# Patient Record
Sex: MALE | Race: WHITE
[De-identification: names, ages, dates, MRNs, and addresses within clinical notes are randomized per-mention and may not be internally consistent; named-entity substitution may affect disease eponyms.]

---

## 2019-03-25 ENCOUNTER — HOSPITAL ENCOUNTER (OUTPATIENT)
Dept: HOSPITAL 25 - AA | Age: 79
Setting detail: OBSERVATION
LOS: 1 days | Discharge: HOME | End: 2019-03-26
Attending: ORTHOPAEDIC SURGERY | Admitting: ORTHOPAEDIC SURGERY
Payer: MEDICARE

## 2019-03-25 DIAGNOSIS — M12.511: Primary | ICD-10-CM
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
## 2019-03-25 DIAGNOSIS — Z87.891: ICD-10-CM

## 2019-03-25 DIAGNOSIS — T14.90XS: ICD-10-CM

## 2019-03-25 DIAGNOSIS — Z79.82: ICD-10-CM

## 2019-03-25 DIAGNOSIS — Z88.0: ICD-10-CM

## 2019-03-25 PROCEDURE — C1713 ANCHOR/SCREW BN/BN,TIS/BN: HCPCS

## 2019-03-25 PROCEDURE — G0378 HOSPITAL OBSERVATION PER HR: HCPCS

## 2019-03-25 PROCEDURE — 85018 HEMOGLOBIN: CPT

## 2019-03-25 PROCEDURE — 80048 BASIC METABOLIC PNL TOTAL CA: CPT

## 2019-03-25 PROCEDURE — 85014 HEMATOCRIT: CPT

## 2019-03-25 PROCEDURE — 85048 AUTOMATED LEUKOCYTE COUNT: CPT

## 2019-03-25 PROCEDURE — 36415 COLL VENOUS BLD VENIPUNCTURE: CPT

## 2019-03-25 PROCEDURE — 85049 AUTOMATED PLATELET COUNT: CPT

## 2019-03-25 PROCEDURE — C1776 JOINT DEVICE (IMPLANTABLE): HCPCS

## 2019-03-25 PROCEDURE — 87641 MR-STAPH DNA AMP PROBE: CPT

## 2019-03-25 RX ADMIN — TRAMADOL HYDROCHLORIDE SCH MG: 50 TABLET, FILM COATED ORAL at 17:39

## 2019-03-25 RX ADMIN — MAGNESIUM HYDROXIDE SCH ML: 400 SUSPENSION ORAL at 20:26

## 2019-03-25 RX ADMIN — DOCUSATE SODIUM SCH MG: 100 CAPSULE, LIQUID FILLED ORAL at 20:26

## 2019-03-25 RX ADMIN — CLINDAMYCIN IN 5 PERCENT DEXTROSE SCH MLS/HR: 12 INJECTION, SOLUTION INTRAVENOUS at 20:15

## 2019-03-25 RX ADMIN — ACETAMINOPHEN SCH MG: 325 TABLET ORAL at 22:33

## 2019-03-25 RX ADMIN — DEXTROSE MONOHYDRATE AND SODIUM CHLORIDE SCH MLS/HR: 5; .45 INJECTION, SOLUTION INTRAVENOUS at 16:19

## 2019-03-25 RX ADMIN — Medication SCH: at 20:26

## 2019-03-25 NOTE — XMS REPORT
Continuity of Care Document (CCD)

 Created on:2019



Patient:Savage Garvin

Sex:Male

:1940

External Reference #:[id number]





Demographics







 Address  [address] [facility] [address]



   Serjio [place]



   [place] [address]

 

 Mobile Phone  [phone]

 

 Email Address  [email or web address]

 

 Preferred Language  en

 

 Marital Status  Not  or 

 

 Mosque Affiliation  Unknown

 

 Race  White

 

 Ethnic Group  Not  or 









Author







 Name  Aditi Jean









Support







 Name  Relationship  Address  Phone

 

 Christoph Garvin  Unavailable  [email or web address]  [phone]



     [place] [address]  









Care Team Providers







 Name  Role  Phone

 

 Deny Manzano DO  Primary Care Physician  Unavailable









Payers







 Date  Identification Numbers  Payment Provider  Subscriber

 

 Effective: 2005  Policy Number: [id number]  Medicare  Savage DOSHI Bharathi









 PayID: [id number]  [address]









 [place] [address]









 Effective: 2016  Policy Number: [id number]  [employer]  Savage Garvin









 PayID: [id number]  [address] [address]









 [place] [address]









   Policy Number: [id number]  Medicaid  Savage Garvin









 PayID: [id number]  [address]









 [place] [address]









 Effective: 2017  Policy Number: [id number]  [employer]  Savage Garvin









 Expires: 10/17/2018  Group Number: 90%  [address] [facility]









 PayID: [id number]  [facility] [address]









 [place] [address]









 Onset: 2014  Policy Number: [id number]   Controverted  Savage GLENDA Bharathi









 Group Number: [id number]

 

 PayID: [id number]









 Expires: 2016  Policy Number: [id number]  [facility]  
Savage GLENDA Bharathi









 PayID: [id number]  [address]









 [place] [address]









 Onset: 2014  Policy Number: [id number]  [facility]  Savage DOSHI 
Bahrathi









 Group Number: [id number]  [address] [address]

 

 [place]: [id number] [address]







Advance Directives







 Description

 

 No Information Available







Problems







 Date  Description  Provider  Status

 

 Onset: 2015  Full thickness rotator cuff tear  Guerrero Nunez M.D.  
Active

 

 Onset: 2016  Rotator cuff tear arthropathy  Guerrero Nunez M.D.  Active

 

 Onset: 06/15/2017  Traumatic arthropathy of the shoulder  Guerrero Nunez M.D.
  Active



   region    







Family History







 Date  Family Member(s)  Observation  Comments

 

   General  No Current Problems  

 

   General  Breast Cancer  

 

   General  Heart Disease  







Social History







 Type  Date  Description  Comments

 

 Birth Sex    Unknown  

 

 Marital Status      

 

 Lives With    Son  

 

 Occupation    Retired  

 

 Cigarette Use    Negative For Quit 11 Years  



     Ago  

 

 Cigarette Use    Quit 25 Years Ago  

 

 ETOH Use    Denies alcohol use  

 

 Tobacco Use  Start: Unknown End:  Patient is a former smoker  



   Unknown    

 

 Recreational Drug Use    Denies Drug Use  

 

 Smoking Status  Reviewed: 19  Patient is a former smoker  

 

 Exercise Type/Frequency    Exercises regularly  







Allergies, Adverse Reactions, Alerts







 Date  Description  Reaction  Status  Severity  Comments

 

 2013  Penicillin  Contact dermatitis  Active    







Medications







 Medication  Date  Status  Form  Strength  Qnty  SIG  Indications  Ordering



                 Provider

 

 Ultram  /  Active  Tablets  50mg  40tab  1-2 by    Guerrero



   2015  mouthat    Enrique,



             night as    M.DAshley



             needed, can    



             take with 2    



             es tylenol    

 

 Aspirin Low  00/00/  Active  Tablets  81mg  30tab  1 po qd    Unknown



 Dose  0000        s      

 

 Paroxetine HCL  0000/  Active  Tablets  10mg  30tab  1 by mouth    Unknown



   0000        s  every day    

 

 Omeprazole  00/  Active  Capsules DR  20mg  90cap  1 by mouth    Unknown



   0000        s  every day    

 

 Megace Oral  00/  Active  Suspension  40mg/ml  1Mont  10    Unknown



   0000        h  milliliters    



             by mouth    



             every day    

 

 Tylenol  00/00/  Active  Tablets  325mg    as needed    Unknown



   0000              

 

                 

 

 Methotrexate  /  Hx  Tablets  2.5mg        Kimberlyn Del Castillo



   /              MIRACLE Andrew              

 

 Vit D  /  Hx    400Iu    1 daily    Unknown



    -              



   2019              

 

 Fish Oil  /00/  Hx            Unknown



    -              



   2018              

 

 Levitra  00/  Hx  Tablets  10mg  9tabs  qd prn    Unknown



    -              



   2018              

 

 Calcium + D3  00/  Hx  Tablets  600-200mg    1 pobid    Unknown



   0000 -      -Unit        



   2014              

 

 Vitamin B-12  /00/  Hx  Tablets  100mcg    1 by mouth    Unknown



   0000 -          every day    



   10/01/              



   2018              

 

 Aspir-Low  /00/  Hx  Tablets DR  81mg  30tab  1 by mouth    Unknown



   0000 -        s  every day    



   2014              

 

 Caltrate 600+D  00/  Hx  Chewtabs  600-400mg    1 by mouth    Unknown



   0000 -      -Unit    twice a day    



   2019              







Immunizations







 Description

 

 No Information Available







Vital Signs







 Date  Vital  Result  Comment

 

 2019  8:41am  Height  67 inches  5'7"









 Weight  159.00 lb  

 

 Heart Rate  68 /min  

 

 BP Systolic Recheck  140 mmHg  

 

 BP Diastolic Recheck  86 mmHg  

 

 Respiratory Rate  16 /min  

 

 Body Temperature  97.6 F  

 

 BMI (Body Mass Index)  24.9 kg/m2  









 2019  9:36am  Height  67 inches  5'7"









 Weight  159.00 lb  

 

 Heart Rate  76 /min  

 

 BP Systolic Recheck  134 mmHg  

 

 BP Diastolic Recheck  80 mmHg  

 

 Respiratory Rate  16 /min  

 

 Body Temperature  98.6 F  

 

 BMI (Body Mass Index)  24.9 kg/m2  









 2019  9:06am  Height  67 inches  5'7"









 Weight  165.00 lb  

 

 Heart Rate  72 /min  

 

 BP Systolic Recheck  144 mmHg  

 

 BP Diastolic Recheck  88 mmHg  

 

 Respiratory Rate  16 /min  

 

 Body Temperature  97.7 F  

 

 BMI (Body Mass Index)  25.8 kg/m2  









 10/23/2018  9:17am  Height  67 inches  5'7"









 Weight  159.00 lb  

 

 Heart Rate  76 /min  

 

 BP Systolic Recheck  132 mmHg  

 

 BP Diastolic Recheck  84 mmHg  

 

 Respiratory Rate  16 /min  

 

 Body Temperature  975.0 F  

 

 BMI (Body Mass Index)  24.9 kg/m2  









 2018  9:13am  Height  67 inches  5'7"









 Weight  159.00 lb  

 

 Heart Rate  76 /min  

 

 BP Systolic Recheck  130 mmHg  

 

 BP Diastolic Recheck  84 mmHg  

 

 Respiratory Rate  16 /min  

 

 Body Temperature  97.4 F  

 

 BMI (Body Mass Index)  24.9 kg/m2  









 2018  9:34am  Height  67 inches  5'7"









 Weight  155.00 lb  

 

 Heart Rate  76 /min  

 

 BP Systolic Recheck  122 mmHg  

 

 BP Diastolic Recheck  78 mmHg  

 

 Respiratory Rate  16 /min  

 

 Body Temperature  98.0 F  

 

 BMI (Body Mass Index)  24.3 kg/m2  









 2018  8:58am  Height  67 inches  5'7"









 Weight  155.00 lb  

 

 Heart Rate  80 /min  

 

 BP Systolic Sitting  122 mmHg  sitting, right arm regular cuff

 

 BP Diastolic Sitting  60 mmHg  sitting, right arm regular cuff

 

 Body Temperature  97.5 F  

 

 BMI (Body Mass Index)  24.3 kg/m2  









 2018  9:23am  Height  67 inches  5'7"









 Weight  160.00 lb  

 

 Heart Rate  80 /min  

 

 BP Systolic Recheck  128 mmHg  

 

 BP Diastolic Recheck  84 mmHg  

 

 Respiratory Rate  16 /min  

 

 Body Temperature  98.0 F  

 

 BMI (Body Mass Index)  25.1 kg/m2  









 2018  9:22am  Height  67 inches  5'7"









 Weight  155.00 lb  

 

 Heart Rate  76 /min  

 

 BP Systolic Recheck  132 mmHg  

 

 BP Diastolic Recheck  86 mmHg  

 

 Respiratory Rate  16 /min  

 

 Body Temperature  98.0 F  

 

 BMI (Body Mass Index)  24.3 kg/m2  









 10/17/2017  9:23am  Height  65 inches  5'5"









 Weight  155.00 lb  

 

 Heart Rate  84 /min  

 

 BP Systolic Recheck  126 mmHg  

 

 BP Diastolic Recheck  84 mmHg  

 

 Respiratory Rate  16 /min  

 

 Body Temperature  97.8 F  

 

 BMI (Body Mass Index)  25.8 kg/m2  









 2017  7:49am  Height  67 inches  5'7"









 Weight  155.00 lb  

 

 Heart Rate  88 /min  

 

 BP Systolic Recheck  126 mmHg  

 

 BP Diastolic Recheck  82 mmHg  

 

 Respiratory Rate  16 /min  

 

 Body Temperature  97.9 F  

 

 BMI (Body Mass Index)  24.3 kg/m2  









 06/15/2017  7:49am  Height  67 inches  5'7"









 Weight  155.00 lb  

 

 Heart Rate  76 /min  

 

 BP Systolic Recheck  132 mmHg  

 

 BP Diastolic Recheck  84 mmHg  

 

 Respiratory Rate  16 /min  

 

 Body Temperature  97.7 F  

 

 BMI (Body Mass Index)  24.3 kg/m2  









 2017  7:50am  Height  67 inches  5'7"









 Weight  160.00 lb  

 

 Heart Rate  76 /min  

 

 BP Systolic Recheck  126 mmHg  

 

 BP Diastolic Recheck  84 mmHg  

 

 Respiratory Rate  16 /min  

 

 Body Temperature  98.3 F  

 

 BMI (Body Mass Index)  25.1 kg/m2  









 2017  8:24am  Height  67 inches  5'7"









 Weight  160.00 lb  

 

 Heart Rate  80 /min  

 

 BP Systolic Recheck  128 mmHg  

 

 BP Diastolic Recheck  82 mmHg  

 

 Respiratory Rate  16 /min  

 

 Body Temperature  98.4 F  

 

 BMI (Body Mass Index)  25.1 kg/m2  









 2016  8:13am  Height  68 inches  5'8"









 Weight  130.00 lb  

 

 Heart Rate  80 /min  

 

 BP Systolic Recheck  142 mmHg  

 

 BP Diastolic Recheck  86 mmHg  

 

 Respiratory Rate  16 /min  

 

 Body Temperature  98.0 F  

 

 BMI (Body Mass Index)  19.8 kg/m2  









 10/04/2016  8:41am  Height  67 inches  5'7"









 Weight  160.00 lb  

 

 Heart Rate  72 /min  

 

 BP Systolic Recheck  122 mmHg  

 

 BP Diastolic Recheck  76 mmHg  

 

 Respiratory Rate  16 /min  

 

 Body Temperature  97.8 F  

 

 BMI (Body Mass Index)  25.1 kg/m2  









 2016  9:53am  Height  67 inches  5'7"









 Weight  150.00 lb  

 

 Heart Rate  80 /min  

 

 BP Systolic Recheck  126 mmHg  

 

 BP Diastolic Recheck  84 mmHg  

 

 Respiratory Rate  16 /min  

 

 Body Temperature  98.0 F  

 

 BMI (Body Mass Index)  23.5 kg/m2  









 2016  8:43am  Height  68 inches  5'8"









 Weight  160.00 lb  

 

 Heart Rate  80 /min  

 

 BP Systolic Recheck  138 mmHg  

 

 BP Diastolic Recheck  86 mmHg  

 

 Respiratory Rate  16 /min  

 

 Body Temperature  98.0 F  

 

 BMI (Body Mass Index)  24.3 kg/m2  









 2016  8:51am  Height  68 inches  5'8"









 Weight  130.00 lb  

 

 Heart Rate  76 /min  

 

 BP Systolic Recheck  130 mmHg  

 

 BP Diastolic Recheck  84 mmHg  

 

 Respiratory Rate  16 /min  

 

 Body Temperature  98.0 F  

 

 BMI (Body Mass Index)  19.8 kg/m2  









 2015  8:45am  Height  68 inches  5'8"









 Weight  130.00 lb  

 

 Heart Rate  80 /min  

 

 BP Systolic Recheck  128 mmHg  

 

 BP Diastolic Recheck  84 mmHg  

 

 Respiratory Rate  16 /min  

 

 Body Temperature  97.7 F  

 

 BMI (Body Mass Index)  19.8 kg/m2  









 2015  1:44pm  Height  68 inches  5'8"









 Weight  135.00 lb  

 

 BP Systolic Recheck  130 mmHg  

 

 BP Diastolic Recheck  76 mmHg  

 

 Respiratory Rate  16 /min  

 

 Body Temperature  97.9 F  

 

 BMI (Body Mass Index)  20.5 kg/m2  









 2015  8:45am  Height  68 inches  5'8"









 Weight  140.00 lb  

 

 Heart Rate  80 /min  

 

 BP Systolic Recheck  126 mmHg  

 

 BP Diastolic Recheck  80 mmHg  

 

 Respiratory Rate  16 /min  

 

 Body Temperature  97.3 F  

 

 Pain Level  3  

 

 BMI (Body Mass Index)  21.3 kg/m2  









 2015  9:28am  Height  68 inches  5'8"









 Weight  160.00 lb  

 

 Heart Rate  72 /min  

 

 BP Systolic Recheck  130 mmHg  

 

 BP Diastolic Recheck  84 mmHg  

 

 Respiratory Rate  16 /min  

 

 Body Temperature  160.0 F  

 

 Pain Level  7  

 

 BMI (Body Mass Index)  24.3 kg/m2  









 2015  9:02am  Height  68 inches  5'8"









 Weight  140.06 lb  

 

 Heart Rate  76 /min  

 

 BP Systolic Recheck  126 mmHg  

 

 BP Diastolic Recheck  80 mmHg  

 

 Respiratory Rate  16 /min  

 

 Body Temperature  97.6 F  

 

 Pain Level  5  

 

 BMI (Body Mass Index)  21.3 kg/m2  









 2014  9:30am  Height  68 inches  5'8"









 Weight  130.00 lb  

 

 Heart Rate  80 /min  

 

 BP Systolic Recheck  124 mmHg  

 

 BP Diastolic Recheck  84 mmHg  

 

 Respiratory Rate  16 /min  

 

 Body Temperature  97.7 F  

 

 Pain Level  0  

 

 BMI (Body Mass Index)  19.8 kg/m2  









 2014 10:48am  Height  68 inches  5'8"









 Weight  130.00 lb  

 

 Heart Rate  80 /min  

 

 BP Systolic Recheck  130 mmHg  

 

 BP Diastolic Recheck  86 mmHg  

 

 Respiratory Rate  16 /min  

 

 Body Temperature  98.0 F  

 

 Pain Level  4  

 

 BMI (Body Mass Index)  19.8 kg/m2  







Results







 Description

 

 No Information Available







Procedures







 Date  Code  Description  Status

 

 201910  Inject/Drain Joint/Bursa Major W/O US  Completed

 

 201910  Inject/Drain Joint/Bursa Major W/O US  Completed

 

 10/23/2018  [id number]  Inject/Drain Joint/Bursa Major W/O US  Completed

 

 201810  Inject/Drain Joint/Bursa Major W/O US  Completed

 

 2018  [id number]  Inject/Drain Joint/Bursa Major W/O US  Completed

 

 201810  Inject/Drain Joint/Bursa Major W/O US  Completed

 

 201810  Inject/Drain Joint/Bursa Major W/O US  Completed

 

 201810  Inject/Drain Joint/Bursa Major W/O US  Completed

 

 201810  Inject/Drain Joint/Bursa Major W/O US  Completed

 

 10/17/2017  [id number]  Inject/Drain Joint/Bursa Major W/O US  Completed

 

 201710  Inject/Drain Joint/Bursa Major W/O US  Completed

 

 06/15/2017  [id number]  Inject/Drain Joint/Bursa Major W/O US  Completed

 

 06/15/2017  26015  Inject/Drain Joint/Bursa Major W/O US  Completed

 

 201710  Inject/Drain Joint/Bursa Major W/O US  Completed

 

 201710  Inject/Drain Joint/Bursa Major W/O US  Completed

 

 201610  Inject/Drain Joint/Bursa Major W/O US  Completed

 

 201610  Inject/Drain Joint/Bursa Major W/O US  Completed

 

 10/04/2016  06892  Inject/Drain Joint/Bursa Major W/O US  Completed

 

 2016  Inject/Drain Joint/Bursa Major W/O US  Completed

 

 2016  83162  Inject Tendon Sheath Or Ligament Aponeurosis Eg Plantar  
Completed



     Fascia  

 

 201610  Inject/Drain Joint/Bursa Major W/O US  Completed

 

 201510  Inject/Drain Joint/Bursa Major W/O US  Completed

 

 201510  Inject/Drain Joint/Bursa Major W/O US  Completed

 

 201510  Inject/Drain Joint/Bursa Major W/O US  Completed

 

 201510  Inject/Drain Joint/Bursa Major W/O US  Completed

 

 201410  Inject/Drain Joint/Bursa Major W/O US  Completed

 

 201410  Inject/Drain Joint/Bursa Major W/O US  Completed







Encounters







 Type  Date  Location  Provider  Dx  Diagnosis

 

 Office Visit  2019  Orthopedic  KRISTYN Suero2.511  Traumatic



   9:30a  [facility]  M.DAshley    arthropathy,



     [place]      right shoulder

 

 Office Visit  10/04/2016  ZANDRA Beaver.121  Complete



   9:00a  [facility] M.[facility]    rotatr-cuff



     [place]      tear/ruptr of r



           shoulder, not



           trauma









 M19.111  Post-traumatic osteoarthritis, right shoulder









 Office Visit  2016  Bo ALCALA.111  Post-traumatic



   10:15a  [facility] M[facility]    osteoarthritis,



     [facility] [place]      right shoulder

 

 Office Visit  2016  Bo DE PAZ.122  Complete rotatr-cuff



   9:00a  [facility] M[facility]    tear/ruptr of left



     AT [place]      shoulder, not trauma









 M19.112  Post-traumatic osteoarthritis, left shoulder









 Office Visit  2015  9:00a  Bo DE PAZ.122  Complete



     [facility]    rotatr-cuff



     [facility] [place]      tear/ruptr of



           left shoulder,



           not trauma









 M19.112  Post-traumatic osteoarthritis, left shoulder









 Office Visit  2015  2:15p  Orthopedic  Guerrero  M75.122  Complete



     [facility]    rotatr-cuff



     AT [place]      tear/ruptr of



           left shoulder,



           not trauma









 M19.112  Post-traumatic osteoarthritis, left shoulder









 Office Visit  2015  9:15a  Orthopedic  Kimberlyn Del Castillo,  719.41  Pain 
Joint



     [facility]  M.D.    Shoulder Region



     AT [place]      









 715.31  Osteoarthrosis Localzd Not Spec Prime Or 2Ndy Shoulder









 Office Visit  2015  9:00a  Orthopedic  Kimberlyn Del Castillo  719.41  Pain 
Joint



     [facility]  M.D.    Shoulder Region



     [facility] [place]      









 719.41  Pain Joint Shoulder Region

 

 715.31  Osteoarthrosis Localzd Not Spec Prime Or 2Ndy Shoulder









 Office Visit  2014  9:30a  Orthopedic  Kimberlyn Del Castillo  719.41  Pain 
Joint



     [facility]  M.D.    Shoulder Region



     [facility] [place]      

 

 Office Visit  2014 10:45a  Orthopedic  Kimberlyn Del Castillo,  719.41  Pain 
Joint



     [facility]  M.D.    Shoulder Region



     AT [place]      









 787.20  Dysphagia, Unspecified









 Office Visit  2013  1:00p  Orthopedic  Guerrero Nunez,  840.4  Sprains &



     [facility]  M.KEL    Strains Rotator



     AT [place]      Cuff (Capsule)







Plan of Treatment

Future Appointment(s):2019  8:00 am - Guerrero Nunez M.D. at [facility] 
[facility] AT [facility]01/25/2019  9:30 am - Guerrero Nunez M.D. at 
[facility] - Guerrero Nunez M.D.M75.122 Complete 
rotator cuff tear or rupture of left shoulder, notM19.112 Post-traumatic 
osteoarthritis, left shoulderFollow up:4 months

## 2019-03-25 NOTE — XMS REPORT
Continuity of Care Document (CCD)

 Created on:2019



Patient:Savage Garvin

Sex:Male

:1940

External Reference #:[id number]





Demographics







 Address  [address] [address] [facility] [address]



   Serjio Soares



   [place] [address]

 

 Mobile Phone  [phone]

 

 Email Address  [email or web address]

 

 Preferred Language  en

 

 Marital Status  Not  or 

 

 Islam Affiliation  Unknown

 

 Race  White

 

 Ethnic Group  Not  or 









Author







 Name  Rizwan Braggina









Support







 Name  Relationship  Address  Phone

 

 Christoph Garvin  Unavailable  [address]  [phone]



     [place] [address]  









Care Team Providers







 Name  Role  Phone

 

 Deny Manzano DO  Primary Care Physician  Unavailable









Payers







 Date  Identification Numbers  Payment Provider  Subscriber

 

 Effective: 2005  Policy Number: [id number]  Medicare  Savage DOSHI Bharathi









 PayID: [id number]  [address]









 [place] [address]









 Effective: 2016  Policy Number: [id number]  [employer]  Savage Garvin









 PayID: [id number]  [address] [address]









 [place] [address]









   Policy Number: [id number]  Medicaid  Savage Garvin









 PayID: [id number]  [address]









 [place] [address]









 Effective: 2017  Policy Number: [id number]  [employer]  Savage Garvin









 Expires: 10/17/2018  Group Number: 90%  [address] [facility]









 PayID: [id number]  [facility] [address]









 [place] [address]









 Onset: 2014  Policy Number: [id number]   Controverted  Savage Garvin









 Group Number: [id number]

 

 PayID: [id number]









 Expires: 2016  Policy Number: [id number]  [facility]  
Savage GLENDA Bharathi









 PayID: [id number]  [address]









 [place] [address]









 Onset: 2014  Policy Number: [id number]  [facility]  Savage DOSHI 
Bharathi









 Group Number: [id number]  [address] [address]

 

 [place]: [id number] [address]







Advance Directives







 Description

 

 No Information Available







Problems







 Date  Description  Provider  Status

 

 Onset: 2015  Full thickness rotator cuff tear  Guerrero Nunez M.D.  
Active

 

 Onset: 2016  Rotator cuff tear arthropathy  Guerrero Nunez M.D.  Active

 

 Onset: 06/15/2017  Traumatic arthropathy of the shoulder  Guerrero Nunez M.D.
  Active



   region    







Family History







 Date  Family Member(s)  Observation  Comments

 

   General  No Current Problems  

 

   General  Breast Cancer  

 

   General  Heart Disease  







Social History







 Type  Date  Description  Comments

 

 Birth Sex    Unknown  

 

 Marital Status      

 

 Lives With    Son  

 

 Occupation    Retired  

 

 Cigarette Use    Negative For Quit 11 Years  



     Ago  

 

 Cigarette Use    Quit 25 Years Ago  

 

 ETOH Use    Denies alcohol use  

 

 Tobacco Use  Start: Unknown End:  Patient is a former smoker  



   Unknown    

 

 Recreational Drug Use    Denies Drug Use  

 

 Smoking Status  Reviewed: 19  Patient is a former smoker  

 

 Exercise Type/Frequency    Exercises regularly  







Allergies, Adverse Reactions, Alerts







 Date  Description  Reaction  Status  Severity  Comments

 

 2013  Penicillin  Contact dermatitis  Active    







Medications







 Medication  Date  Status  Form  Strength  Qnty  SIG  Indications  Ordering



                 Provider

 

 Ultram  /  Active  Tablets  50mg  40tab  1-2 by    Guerrero



   2015  mouthat    Enrique,



             night as    M.DAshley



             needed, can    



             take with 2    



             es tylenol    

 

 Aspirin Low  00/00/  Active  Tablets  81mg  30tab  1 po qd    Unknown



 Dose  0000        s      

 

 Paroxetine HCL  0000/  Active  Tablets  10mg  30tab  1 by mouth    Unknown



   0000        s  every day    

 

 Omeprazole  00/  Active  Capsules DR  20mg  90cap  1 by mouth    Unknown



   0000        s  every day    

 

 Megace Oral  00/  Active  Suspension  40mg/ml  1Mont  10    Unknown



   0000        h  milliliters    



             by mouth    



             every day    

 

 Tylenol  00/00/  Active  Tablets  325mg    as needed    Unknown



   0000              

 

                 

 

 Methotrexate  /  Hx  Tablets  2.5mg        Kimberlyn Del Castillo



   /              MIRACLE Andrew              

 

 Vit D  /  Hx    400Iu    1 daily    Unknown



    -              



   2019              

 

 Fish Oil  /00/  Hx            Unknown



    -              



   2018              

 

 Levitra  00/  Hx  Tablets  10mg  9tabs  qd prn    Unknown



    -              



   2018              

 

 Calcium + D3  00/  Hx  Tablets  600-200mg    1 pobid    Unknown



   0000 -      -Unit        



   2014              

 

 Vitamin B-12  /00/  Hx  Tablets  100mcg    1 by mouth    Unknown



   0000 -          every day    



   10/01/              



   2018              

 

 Aspir-Low  /00/  Hx  Tablets DR  81mg  30tab  1 by mouth    Unknown



   0000 -        s  every day    



   2014              

 

 Caltrate 600+D  00/  Hx  Chewtabs  600-400mg    1 by mouth    Unknown



   0000 -      -Unit    twice a day    



   2019              







Immunizations







 Description

 

 No Information Available







Vital Signs







 Date  Vital  Result  Comment

 

 2019  9:36am  Height  67 inches  5'7"









 Weight  159.00 lb  

 

 Heart Rate  76 /min  

 

 BP Systolic Recheck  134 mmHg  

 

 BP Diastolic Recheck  80 mmHg  

 

 Respiratory Rate  16 /min  

 

 Body Temperature  98.6 F  

 

 BMI (Body Mass Index)  24.9 kg/m2  









 2019  9:06am  Height  67 inches  5'7"









 Weight  165.00 lb  

 

 Heart Rate  72 /min  

 

 BP Systolic Recheck  144 mmHg  

 

 BP Diastolic Recheck  88 mmHg  

 

 Respiratory Rate  16 /min  

 

 Body Temperature  97.7 F  

 

 BMI (Body Mass Index)  25.8 kg/m2  









 10/23/2018  9:17am  Height  67 inches  5'7"









 Weight  159.00 lb  

 

 Heart Rate  76 /min  

 

 BP Systolic Recheck  132 mmHg  

 

 BP Diastolic Recheck  84 mmHg  

 

 Respiratory Rate  16 /min  

 

 Body Temperature  975.0 F  

 

 BMI (Body Mass Index)  24.9 kg/m2  









 2018  9:13am  Height  67 inches  5'7"









 Weight  159.00 lb  

 

 Heart Rate  76 /min  

 

 BP Systolic Recheck  130 mmHg  

 

 BP Diastolic Recheck  84 mmHg  

 

 Respiratory Rate  16 /min  

 

 Body Temperature  97.4 F  

 

 BMI (Body Mass Index)  24.9 kg/m2  









 2018  9:34am  Height  67 inches  5'7"









 Weight  155.00 lb  

 

 Heart Rate  76 /min  

 

 BP Systolic Recheck  122 mmHg  

 

 BP Diastolic Recheck  78 mmHg  

 

 Respiratory Rate  16 /min  

 

 Body Temperature  98.0 F  

 

 BMI (Body Mass Index)  24.3 kg/m2  









 2018  8:58am  Height  67 inches  5'7"









 Weight  155.00 lb  

 

 Heart Rate  80 /min  

 

 BP Systolic Sitting  122 mmHg  sitting, right arm regular cuff

 

 BP Diastolic Sitting  60 mmHg  sitting, right arm regular cuff

 

 Body Temperature  97.5 F  

 

 BMI (Body Mass Index)  24.3 kg/m2  









 2018  9:23am  Height  67 inches  5'7"









 Weight  160.00 lb  

 

 Heart Rate  80 /min  

 

 BP Systolic Recheck  128 mmHg  

 

 BP Diastolic Recheck  84 mmHg  

 

 Respiratory Rate  16 /min  

 

 Body Temperature  98.0 F  

 

 BMI (Body Mass Index)  25.1 kg/m2  









 2018  9:22am  Height  67 inches  5'7"









 Weight  155.00 lb  

 

 Heart Rate  76 /min  

 

 BP Systolic Recheck  132 mmHg  

 

 BP Diastolic Recheck  86 mmHg  

 

 Respiratory Rate  16 /min  

 

 Body Temperature  98.0 F  

 

 BMI (Body Mass Index)  24.3 kg/m2  









 10/17/2017  9:23am  Height  65 inches  5'5"









 Weight  155.00 lb  

 

 Heart Rate  84 /min  

 

 BP Systolic Recheck  126 mmHg  

 

 BP Diastolic Recheck  84 mmHg  

 

 Respiratory Rate  16 /min  

 

 Body Temperature  97.8 F  

 

 BMI (Body Mass Index)  25.8 kg/m2  









 2017  7:49am  Height  67 inches  5'7"









 Weight  155.00 lb  

 

 Heart Rate  88 /min  

 

 BP Systolic Recheck  126 mmHg  

 

 BP Diastolic Recheck  82 mmHg  

 

 Respiratory Rate  16 /min  

 

 Body Temperature  97.9 F  

 

 BMI (Body Mass Index)  24.3 kg/m2  









 06/15/2017  7:49am  Height  67 inches  5'7"









 Weight  155.00 lb  

 

 Heart Rate  76 /min  

 

 BP Systolic Recheck  132 mmHg  

 

 BP Diastolic Recheck  84 mmHg  

 

 Respiratory Rate  16 /min  

 

 Body Temperature  97.7 F  

 

 BMI (Body Mass Index)  24.3 kg/m2  









 2017  7:50am  Height  67 inches  5'7"









 Weight  160.00 lb  

 

 Heart Rate  76 /min  

 

 BP Systolic Recheck  126 mmHg  

 

 BP Diastolic Recheck  84 mmHg  

 

 Respiratory Rate  16 /min  

 

 Body Temperature  98.3 F  

 

 BMI (Body Mass Index)  25.1 kg/m2  









 2017  8:24am  Height  67 inches  5'7"









 Weight  160.00 lb  

 

 Heart Rate  80 /min  

 

 BP Systolic Recheck  128 mmHg  

 

 BP Diastolic Recheck  82 mmHg  

 

 Respiratory Rate  16 /min  

 

 Body Temperature  98.4 F  

 

 BMI (Body Mass Index)  25.1 kg/m2  









 2016  8:13am  Height  68 inches  5'8"









 Weight  130.00 lb  

 

 Heart Rate  80 /min  

 

 BP Systolic Recheck  142 mmHg  

 

 BP Diastolic Recheck  86 mmHg  

 

 Respiratory Rate  16 /min  

 

 Body Temperature  98.0 F  

 

 BMI (Body Mass Index)  19.8 kg/m2  









 10/04/2016  8:41am  Height  67 inches  5'7"









 Weight  160.00 lb  

 

 Heart Rate  72 /min  

 

 BP Systolic Recheck  122 mmHg  

 

 BP Diastolic Recheck  76 mmHg  

 

 Respiratory Rate  16 /min  

 

 Body Temperature  97.8 F  

 

 BMI (Body Mass Index)  25.1 kg/m2  









 2016  9:53am  Height  67 inches  5'7"









 Weight  150.00 lb  

 

 Heart Rate  80 /min  

 

 BP Systolic Recheck  126 mmHg  

 

 BP Diastolic Recheck  84 mmHg  

 

 Respiratory Rate  16 /min  

 

 Body Temperature  98.0 F  

 

 BMI (Body Mass Index)  23.5 kg/m2  









 2016  8:43am  Height  68 inches  5'8"









 Weight  160.00 lb  

 

 Heart Rate  80 /min  

 

 BP Systolic Recheck  138 mmHg  

 

 BP Diastolic Recheck  86 mmHg  

 

 Respiratory Rate  16 /min  

 

 Body Temperature  98.0 F  

 

 BMI (Body Mass Index)  24.3 kg/m2  









 2016  8:51am  Height  68 inches  5'8"









 Weight  130.00 lb  

 

 Heart Rate  76 /min  

 

 BP Systolic Recheck  130 mmHg  

 

 BP Diastolic Recheck  84 mmHg  

 

 Respiratory Rate  16 /min  

 

 Body Temperature  98.0 F  

 

 BMI (Body Mass Index)  19.8 kg/m2  









 2015  8:45am  Height  68 inches  5'8"









 Weight  130.00 lb  

 

 Heart Rate  80 /min  

 

 BP Systolic Recheck  128 mmHg  

 

 BP Diastolic Recheck  84 mmHg  

 

 Respiratory Rate  16 /min  

 

 Body Temperature  97.7 F  

 

 BMI (Body Mass Index)  19.8 kg/m2  









 2015  1:44pm  Height  68 inches  5'8"









 Weight  135.00 lb  

 

 BP Systolic Recheck  130 mmHg  

 

 BP Diastolic Recheck  76 mmHg  

 

 Respiratory Rate  16 /min  

 

 Body Temperature  97.9 F  

 

 BMI (Body Mass Index)  20.5 kg/m2  









 2015  8:45am  Height  68 inches  5'8"









 Weight  140.00 lb  

 

 Heart Rate  80 /min  

 

 BP Systolic Recheck  126 mmHg  

 

 BP Diastolic Recheck  80 mmHg  

 

 Respiratory Rate  16 /min  

 

 Body Temperature  97.3 F  

 

 Pain Level  3  

 

 BMI (Body Mass Index)  21.3 kg/m2  









 2015  9:28am  Height  68 inches  5'8"









 Weight  160.00 lb  

 

 Heart Rate  72 /min  

 

 BP Systolic Recheck  130 mmHg  

 

 BP Diastolic Recheck  84 mmHg  

 

 Respiratory Rate  16 /min  

 

 Body Temperature  160.0 F  

 

 Pain Level  7  

 

 BMI (Body Mass Index)  24.3 kg/m2  









 2015  9:02am  Height  68 inches  5'8"









 Weight  140.06 lb  

 

 Heart Rate  76 /min  

 

 BP Systolic Recheck  126 mmHg  

 

 BP Diastolic Recheck  80 mmHg  

 

 Respiratory Rate  16 /min  

 

 Body Temperature  97.6 F  

 

 Pain Level  5  

 

 BMI (Body Mass Index)  21.3 kg/m2  









 2014  9:30am  Height  68 inches  5'8"









 Weight  130.00 lb  

 

 Heart Rate  80 /min  

 

 BP Systolic Recheck  124 mmHg  

 

 BP Diastolic Recheck  84 mmHg  

 

 Respiratory Rate  16 /min  

 

 Body Temperature  97.7 F  

 

 Pain Level  0  

 

 BMI (Body Mass Index)  19.8 kg/m2  









 2014 10:48am  Height  68 inches  5'8"









 Weight  130.00 lb  

 

 Heart Rate  80 /min  

 

 BP Systolic Recheck  130 mmHg  

 

 BP Diastolic Recheck  86 mmHg  

 

 Respiratory Rate  16 /min  

 

 Body Temperature  98.0 F  

 

 Pain Level  4  

 

 BMI (Body Mass Index)  19.8 kg/m2  







Results







 Description

 

 No Information Available







Procedures







 Date  Code  Description  Status

 

 2019  07857  Inject/Drain Joint/Bursa Major W/O US  Completed

 

 10/23/2018  26749  Inject/Drain Joint/Bursa Major W/O US  Completed

 

 2018  39547  Inject/Drain Joint/Bursa Major W/O US  Completed

 

 2018  07880  Inject/Drain Joint/Bursa Major W/O US  Completed

 

 2018  94097  Inject/Drain Joint/Bursa Major W/O US  Completed

 

 2018  22899  Inject/Drain Joint/Bursa Major W/O US  Completed

 

 2018  99852  Inject/Drain Joint/Bursa Major W/O US  Completed

 

 2018  36559  Inject/Drain Joint/Bursa Major W/O US  Completed

 

 10/17/2017  87567  Inject/Drain Joint/Bursa Major W/O US  Completed

 

 2017  24415  Inject/Drain Joint/Bursa Major W/O US  Completed

 

 06/15/2017  20469  Inject/Drain Joint/Bursa Major W/O US  Completed

 

 06/15/2017  09222  Inject/Drain Joint/Bursa Major W/O US  Completed

 

 2017  02636  Inject/Drain Joint/Bursa Major W/O US  Completed

 

 2017  17684  Inject/Drain Joint/Bursa Major W/O US  Completed

 

 2016  60317  Inject/Drain Joint/Bursa Major W/O US  Completed

 

 2016  75034  Inject/Drain Joint/Bursa Major W/O US  Completed

 

 10/04/2016  35842  Inject/Drain Joint/Bursa Major W/O US  Completed

 

 2016  24405  Inject/Drain Joint/Bursa Major W/O US  Completed

 

 2016  58778  Inject Tendon Sheath Or Ligament Aponeurosis Eg Plantar  
Completed



     Fascia  

 

 2016  Inject/Drain Joint/Bursa Major W/O US  Completed

 

 2015  Inject/Drain Joint/Bursa Major W/O US  Completed

 

 2015  Inject/Drain Joint/Bursa Major W/O US  Completed

 

 2015  Inject/Drain Joint/Bursa Major W/O US  Completed

 

 2015  Inject/Drain Joint/Bursa Major W/O US  Completed

 

 2014  Inject/Drain Joint/Bursa Major W/O US  Completed

 

 2014  Inject/Drain Joint/Bursa Major W/O US  Completed







Encounters







 Type  Date  Location  Provider  Dx  Diagnosis

 

 Office Visit  2019  Orthopedic  YAO Suero.511  Traumatic



   9:30a  [facility]    arthropathy,



     [place]      right shoulder

 

 Office Visit  10/04/2016  ZANDRA Beaver.121  Complete



   9:00a  [facility] M[facility]    rotatr-cuff



     [place]      tear/ruptr of r



           shoulder, not



           trauma









 M19.111  Post-traumatic osteoarthritis, right shoulder









 Office Visit  2016  Bo ALCALA.111  Post-traumatic



   10:15a  [facility] M[facility]    osteoarthritis,



     AT [place]      right shoulder

 

 Office Visit  2016  Bo DE PAZ.122  Complete rotatr-cuff



   9:00a  [facility]    tear/ruptr of left



     AT [place]      shoulder, not trauma









 M19.112  Post-traumatic osteoarthritis, left shoulder









 Office Visit  2015  9:00a  Bo DE PAZ.122  Complete



     [facility]    rotatr-cuff



     AT [place]      tear/ruptr of



           left shoulder,



           not trauma









 M19.112  Post-traumatic osteoarthritis, left shoulder









 Office Visit  2015  2:15p  Bo DE PAZ.122  Complete



     [facility]    rotatr-cuff



     AT [place]      tear/ruptr of



           left shoulder,



           not trauma









 M19.112  Post-traumatic osteoarthritis, left shoulder









 Office Visit  2015  9:15a  Orthopedic  Kimberlyn Del Castillo,  719.41  Pain 
Joint



     [facility]    Shoulder Region



     AT [place]      









 715.31  Osteoarthrosis Localzd Not Spec Prime Or 2Ndy Shoulder









 Office Visit  2015  9:00a  Orthopedic  Kimberlyn Del Castillo,  719.41  Pain 
Joint



     [facility]  M.D.    Shoulder Region



     [place]      









 719.41  Pain Joint Shoulder Region

 

 715.31  Osteoarthrosis Localzd Not Spec Prime Or 2Ndy Shoulder









 Office Visit  2014  9:30a  Orthopedic  Kimberlyn Del Castillo,  719.41  Pain 
Joint



     [facility]  M.DAshley    Shoulder Region



     AT [place]      

 

 Office Visit  2014 10:45a  Orthopedic  Kimberlyn Del Castillo,  719.41  Pain 
Joint



     [facility]  M.KEL    Shoulder Region



     AT [place]      









 787.20  Dysphagia, Unspecified









 Office Visit  2013  1:00p  Orthopedic  Guerrero Nunez,  840.4  Sprains &



     [facility]  M.D.    Strains Rotator



     AT [place]      Cuff (Capsule)







Plan of Treatment

Future Appointment(s):2019  9:30 am - Guerrero Nunez M.D. at [facility] 
[facility] AT Jbhbvlja80/26/2019 - Guerrero Nunez M.D.M12.511 Traumatic 
arthropathy, right shoulderFollow up:10 days after surgery

## 2019-03-25 NOTE — OP
DATE OF OPERATION:  03/25/19 - ROOM #[id number]

 

DATE OF BIRTH:  [birth year]

 

SURGEON:  Guerrero Nunez MD.

 

ASSISTANT:  Kerry Nazario RPA.

 

ANESTHESIA:  Regional and general.

 

PRE-OP DIAGNOSIS:  Right shoulder traumatic arthropathy.

 

POST-OP DIAGNOSIS:  Right shoulder traumatic arthropathy.

 

OPERATIVE PROCEDURE:  Right reverse total shoulder arthroplasty.

 

ESTIMATED BLOOD LOSS:  100 cc.

 

COMPLICATIONS:  None.

 

HARDWARE:  [employer] reverse shoulder small glenoid with 36 mm eccentric 
sphere two screws 16 mm stem with reverse socket plus 3 mm liner.

 

SUMMARY:  Mr. Beavers is a 78-year-old male who has a long history of troubles 
with both of his shoulders.  Many years ago, he had undergone rotator cuff 
repairs, but these eventually did fail as both shoulders have significant 
rotator cuff tears. On plain x-ray, it could seen how his humeral head sits on 
the underside of the acromion and he is CHCF out of joint.  He has 
significant troubles with trying to lift the arms beyond 60 degrees of forward 
elevation and cannot even get the 45 degrees of abduction.  He has undergone 
conservative treatment for years, which was good, so it helped with his pain, 
but it was not very good for function.  Recently, he has been losing 
effectiveness with steroid injections and I discussed with him that a reverse 
shoulder arthroplasty should work well to decrease his pain.  Sometimes, 
function can be improved with the deltoid now functioning as the  of the 
arm, but I had warned him that the procedure was more for pain relief and not 
for restoration of function.  Risks of surgery such as infection, scar formation
, stiffness, instability and hardware failure were some of the risks discussed.
  He had been declared medically optimized and wished to proceed.

 

DESCRIPTION OF PROCEDURE:  The patient had a block placed in the holding area 
and was brought back to the OR.  General endotracheal anesthesia was 
established.  He was then sat up in the beach chair position.  Care  was taken 
to make sure that his left arm was nicely padded an his cubital tunnel was nice 
and free.  The right shoulder area was prepped and then draped.  Skin incision 
was made beginning just above the coracoid and carried downwards towards the 
humeral shaft.  Incision was carried own through the skin and subcutaneous 
tissues.  Small bleeders encountered were ligated using electrocautery.  Blunt 
dissection was then carried down to the deltoid fascia and I could see fat 
stripe and one small spot which appeared to be cephalic vein.  Dissection 
continued in that direction and I did find cephalic vein.  Cephalic vein wanted 
to come laterally and dissection was carried out through that to the medial 
side of the cephalic vein.  Deltoid  retractor was placed and humeral head 
shifted out fluid out of place.  He had a known rotator cuff tear as well as a 
longstanding biceps tendon tear and I thought I could feel which was the groove
, but I thought he still had a little bit of his subscap intact, but there was 
no subscap tendon present. With starting 0.5 cm to what I thought was 0.5 cm 
medial to what I thought was the bicipital groove, all I encountered was capsule
, which then peeled itself back and down exposing the humeral head.  I did not 
even have to come in any way superior as there was no supraspinatus, but there 
was an infraspinatus still present.  Awl was then used to open the humeral head 
and the intramedullary guide was placed.  Outrigger was dissembled and cutting 
guide was then pinned into place.  Intramedullary guide was removed and the 
humeral head cut was taken.  It appeared a nice cut was obtained. Cover was 
placed over the shaft and this was subluxed posteriorly giving exposure to the 
glenoid.  It could be seen that he still had labrum present as well as some 
cartilage on the glenoid.  Pickle fork was then placed posteriorly and this 
gave nice exposure, where I had a straight shot at the glenoid.  Labrum was 
sharply taken down and cross ______ were made so that I could center my 
guidewire right into the glenoid.  Guidewire was placed and nice bite was 
obtained.  Reamer was run and remaining cartilage was taken and bone reamings 
were then taken as well.  Drill was then run for the central stud and guidewire 
was then removed.  Small glenoid was then impacted into place.  Nice solid bite 
had been obtained. Two screws were placed and the inferior screw seemed to stay 
within the body of the scapula as I headed endpoint, so a 35 screw was used 
there and at the top I came through the cortex at about 20 mm and a 20 mm screw 
was placed superiorly.  I templated both 36 and 40 mm glenospheres, but because 
his alignment was so far up, I was unable to get them perfectly aligned.  
Considering he appeared smaller than what I had templated, I went with the 36 
mm eccentric glenosphere.  This was impacted into place and a screw was then 
squeaked into tightness there.  Attention was returned to the proximal humerus.
  Cap was removed and different size stems were placed. With the 16, I had a 
nice bite and I could lift the humerus.  On the back table, stem piece was then 
assembled to allow for the reaming of the proximal humerus. Assembly was then 
placed and reamer was run over the peg until the peg blocked a K- wire from 
being able to be passed through.  Once this was done, the implant was assembled 
on the back table and then was impacted into place.  I had a little rigid bone 
inferiorly and this was nibbled down using the rongeur.  He was then trialed 
with a 0 liner and, with the 0 liner with external rotation and abduction, he 
would tend to lever opening the joint.  He was then trialed with a +3 and the +
3 did not lever and seem to be nice and stable throughout his range of motion.  
+3 liner was called for and impacted into place.  He had the same wonderful 
motion and stability with that liner.  Shoulder was copiously pulse lavaged.  I 
looked for remnants of the capsule, but I could not find the small piece that 
had peeled back on it on and that even may have just been synovium as it had 
opened itself just when I had released where I would have started the subscap 
peel.  He was again taken through range of motion so I could convince myself he 
was stable without any sort of anterior repair and the deltopectoral interval 
was closed using interrupted 2-0 Vicryl sutures while bearing the cephalic 
vein.  Wound was again copiously pulse lavaged and subcutaneous tissues were 
reapproximated with 2-0 Vicryl.  Skin was closed using running Monocryl.  
Sterile dressing and a shoulder immobilizer were applied in the OR.  The 
patient was then extubated in the OR and was stable on transfer to the recovery 
room.

 

 [id number]/CPS #: [id number]

JESÚS

## 2019-03-25 NOTE — XMS REPORT
Continuity of Care Document (CCD)

 Created on:2019



Patient:Savage Garvin

Sex:Male

:1940

External Reference #:[id number]





Demographics







 Address  [address]



   Serjio [place]



   [place] [address]

 

 Mobile Phone  [phone]

 

 Email Address  [email or web address]

 

 Preferred Language  en

 

 Marital Status  Not  or 

 

 Yazdanism Affiliation  Unknown

 

 Race  White

 

 Ethnic Group  Not  or 









Author







 Name  Yoana Reveles









Support







 Name  Relationship  Address  Phone

 

 Christoph Garvin  Unavailable  [address]  [phone]



     [place] [address]  









Care Team Providers







 Name  Role  Phone

 

 Deny Manzano DO  Primary Care Physician  Unavailable









Payers







 Date  Identification Numbers  Payment Provider  Subscriber

 

 Effective: 2005  Policy Number: [id number]  Medicare  Savage GLENDA Bharathi









 PayID: [id number]  [address]









 [place] [address]









 Effective: 2016  Policy Number: [id number]  BS Facets  Savage Wheeleradela









 PayID: [id number]  [address] [address]









 [place] [address]









   Policy Number: [id number]  Medicaid  Savage Garvin









 PayID: [id number]  [address]









 [place] [address]









 Effective: 2017  Policy Number: [id number]  [employer]  Savage Garvin









 Expires: 10/17/2018  Group Number: 90%  [address] [facility]









 PayID: [id number]  [address] [address]









 Onset: 2014  Policy Number: [id number]   Controverted  Savage Garvin









 Group Number: [id number]

 

 PayID: [id number]









 Expires: 2016  Policy Number: [id number]  [facility]  
Savage GLENDA Bharathi









 PayID: [id number]  [address]









 [place] [address]









 Onset: 2014  Policy Number: [id number]  [facility] Insurance [facility]  Savage DOSHI 
Bharathi









 Group Number: [id number]  [address] [address]

 

 [place]: [id number] [address]







Advance Directives







 Description

 

 No Information Available







Problems







 Date  Description  Provider  Status

 

 Onset: 2015  Full thickness rotator cuff tear  Guerrero Nunez M.D.  
Active

 

 Onset: 2016  Rotator cuff tear arthropathy  Guerrero Nunez M.D.  Active

 

 Onset: 06/15/2017  Traumatic arthropathy of the shoulder  Guerrero Nunez M.D.
  Active



   region    







Family History







 Date  Family Member(s)  Observation  Comments

 

   General  No Current Problems  

 

   General  Breast Cancer  

 

   General  Heart Disease  







Social History







 Type  Date  Description  Comments

 

 Birth Sex    Unknown  

 

 Marital Status      

 

 Lives With    Son  

 

 Occupation    Retired  

 

 Cigarette Use    Negative For Quit 11 Years  



     Ago  

 

 Cigarette Use    Quit 25 Years Ago  

 

 ETOH Use    Denies alcohol use  

 

 Tobacco Use  Start: Unknown End:  Patient is a former smoker  



   Unknown    

 

 Recreational Drug Use    Denies Drug Use  

 

 Smoking Status  Reviewed: 19  Patient is a former smoker  

 

 Exercise Type/Frequency    Exercises regularly  







Allergies, Adverse Reactions, Alerts







 Date  Description  Reaction  Status  Severity  Comments

 

 2013  Penicillin  Contact dermatitis  Active    







Medications







 Medication  Date  Status  Form  Strength  Qnty  SIG  Indications  Ordering



                 Provider

 

 Ultram  /  Active  Tablets  50mg  40tab  1-2 by    Guerrero



   2015  mouthat    Enrique,



             night as    M.DAshley



             needed, can    



             take with 2    



             es tylenol    

 

 Aspirin Low  00/00/  Active  Tablets  81mg  30tab  1 po qd    Unknown



 Dose  0000        s      

 

 Paroxetine HCL  0000/  Active  Tablets  10mg  30tab  1 by mouth    Unknown



   0000        s  every day    

 

 Omeprazole  00/  Active  Capsules DR  20mg  90cap  1 by mouth    Unknown



   0000        s  every day    

 

 Megace Oral  00/  Active  Suspension  40mg/ml  1Mont  10    Unknown



   0000        h  milliliters    



             by mouth    



             every day    

 

 Tylenol  00/00/  Active  Tablets  325mg    as needed    Unknown



   0000              

 

                 

 

 Methotrexate  /  Hx  Tablets  2.5mg        Kimberlyn Del Castillo



   /              MIRACLE Andrew              

 

 Vit D  /  Hx    400Iu    1 daily    Unknown



    -              



   2019              

 

 Fish Oil  /00/  Hx            Unknown



    -              



   2018              

 

 Levitra  00/  Hx  Tablets  10mg  9tabs  qd prn    Unknown



    -              



   2018              

 

 Calcium + D3  00/  Hx  Tablets  600-200mg    1 pobid    Unknown



   0000 -      -Unit        



   2014              

 

 Vitamin B-12  /00/  Hx  Tablets  100mcg    1 by mouth    Unknown



   0000 -          every day    



   10/01/              



   2018              

 

 Aspir-Low  /00/  Hx  Tablets DR  81mg  30tab  1 by mouth    Unknown



   0000 -        s  every day    



   2014              

 

 Caltrate 600+D  00/  Hx  Chewtabs  600-400mg    1 by mouth    Unknown



   0000 -      -Unit    twice a day    



   2019              







Immunizations







 Description

 

 No Information Available







Vital Signs







 Date  Vital  Result  Comment

 

 2019  8:41am  Height  67 inches  5'7"









 Weight  159.00 lb  

 

 Heart Rate  68 /min  

 

 BP Systolic Recheck  140 mmHg  

 

 BP Diastolic Recheck  86 mmHg  

 

 Respiratory Rate  16 /min  

 

 Body Temperature  97.6 F  

 

 BMI (Body Mass Index)  24.9 kg/m2  









 2019  9:36am  Height  67 inches  5'7"









 Weight  159.00 lb  

 

 Heart Rate  76 /min  

 

 BP Systolic Recheck  134 mmHg  

 

 BP Diastolic Recheck  80 mmHg  

 

 Respiratory Rate  16 /min  

 

 Body Temperature  98.6 F  

 

 BMI (Body Mass Index)  24.9 kg/m2  









 2019  9:06am  Height  67 inches  5'7"









 Weight  165.00 lb  

 

 Heart Rate  72 /min  

 

 BP Systolic Recheck  144 mmHg  

 

 BP Diastolic Recheck  88 mmHg  

 

 Respiratory Rate  16 /min  

 

 Body Temperature  97.7 F  

 

 BMI (Body Mass Index)  25.8 kg/m2  









 10/23/2018  9:17am  Height  67 inches  5'7"









 Weight  159.00 lb  

 

 Heart Rate  76 /min  

 

 BP Systolic Recheck  132 mmHg  

 

 BP Diastolic Recheck  84 mmHg  

 

 Respiratory Rate  16 /min  

 

 Body Temperature  975.0 F  

 

 BMI (Body Mass Index)  24.9 kg/m2  









 2018  9:13am  Height  67 inches  5'7"









 Weight  159.00 lb  

 

 Heart Rate  76 /min  

 

 BP Systolic Recheck  130 mmHg  

 

 BP Diastolic Recheck  84 mmHg  

 

 Respiratory Rate  16 /min  

 

 Body Temperature  97.4 F  

 

 BMI (Body Mass Index)  24.9 kg/m2  









 2018  9:34am  Height  67 inches  5'7"









 Weight  155.00 lb  

 

 Heart Rate  76 /min  

 

 BP Systolic Recheck  122 mmHg  

 

 BP Diastolic Recheck  78 mmHg  

 

 Respiratory Rate  16 /min  

 

 Body Temperature  98.0 F  

 

 BMI (Body Mass Index)  24.3 kg/m2  









 2018  8:58am  Height  67 inches  5'7"









 Weight  155.00 lb  

 

 Heart Rate  80 /min  

 

 BP Systolic Sitting  122 mmHg  sitting, right arm regular cuff

 

 BP Diastolic Sitting  60 mmHg  sitting, right arm regular cuff

 

 Body Temperature  97.5 F  

 

 BMI (Body Mass Index)  24.3 kg/m2  









 2018  9:23am  Height  67 inches  5'7"









 Weight  160.00 lb  

 

 Heart Rate  80 /min  

 

 BP Systolic Recheck  128 mmHg  

 

 BP Diastolic Recheck  84 mmHg  

 

 Respiratory Rate  16 /min  

 

 Body Temperature  98.0 F  

 

 BMI (Body Mass Index)  25.1 kg/m2  









 2018  9:22am  Height  67 inches  5'7"









 Weight  155.00 lb  

 

 Heart Rate  76 /min  

 

 BP Systolic Recheck  132 mmHg  

 

 BP Diastolic Recheck  86 mmHg  

 

 Respiratory Rate  16 /min  

 

 Body Temperature  98.0 F  

 

 BMI (Body Mass Index)  24.3 kg/m2  









 10/17/2017  9:23am  Height  65 inches  5'5"









 Weight  155.00 lb  

 

 Heart Rate  84 /min  

 

 BP Systolic Recheck  126 mmHg  

 

 BP Diastolic Recheck  84 mmHg  

 

 Respiratory Rate  16 /min  

 

 Body Temperature  97.8 F  

 

 BMI (Body Mass Index)  25.8 kg/m2  









 2017  7:49am  Height  67 inches  5'7"









 Weight  155.00 lb  

 

 Heart Rate  88 /min  

 

 BP Systolic Recheck  126 mmHg  

 

 BP Diastolic Recheck  82 mmHg  

 

 Respiratory Rate  16 /min  

 

 Body Temperature  97.9 F  

 

 BMI (Body Mass Index)  24.3 kg/m2  









 06/15/2017  7:49am  Height  67 inches  5'7"









 Weight  155.00 lb  

 

 Heart Rate  76 /min  

 

 BP Systolic Recheck  132 mmHg  

 

 BP Diastolic Recheck  84 mmHg  

 

 Respiratory Rate  16 /min  

 

 Body Temperature  97.7 F  

 

 BMI (Body Mass Index)  24.3 kg/m2  









 2017  7:50am  Height  67 inches  5'7"









 Weight  160.00 lb  

 

 Heart Rate  76 /min  

 

 BP Systolic Recheck  126 mmHg  

 

 BP Diastolic Recheck  84 mmHg  

 

 Respiratory Rate  16 /min  

 

 Body Temperature  98.3 F  

 

 BMI (Body Mass Index)  25.1 kg/m2  









 2017  8:24am  Height  67 inches  5'7"









 Weight  160.00 lb  

 

 Heart Rate  80 /min  

 

 BP Systolic Recheck  128 mmHg  

 

 BP Diastolic Recheck  82 mmHg  

 

 Respiratory Rate  16 /min  

 

 Body Temperature  98.4 F  

 

 BMI (Body Mass Index)  25.1 kg/m2  









 2016  8:13am  Height  68 inches  5'8"









 Weight  130.00 lb  

 

 Heart Rate  80 /min  

 

 BP Systolic Recheck  142 mmHg  

 

 BP Diastolic Recheck  86 mmHg  

 

 Respiratory Rate  16 /min  

 

 Body Temperature  98.0 F  

 

 BMI (Body Mass Index)  19.8 kg/m2  









 10/04/2016  8:41am  Height  67 inches  5'7"









 Weight  160.00 lb  

 

 Heart Rate  72 /min  

 

 BP Systolic Recheck  122 mmHg  

 

 BP Diastolic Recheck  76 mmHg  

 

 Respiratory Rate  16 /min  

 

 Body Temperature  97.8 F  

 

 BMI (Body Mass Index)  25.1 kg/m2  









 2016  9:53am  Height  67 inches  5'7"









 Weight  150.00 lb  

 

 Heart Rate  80 /min  

 

 BP Systolic Recheck  126 mmHg  

 

 BP Diastolic Recheck  84 mmHg  

 

 Respiratory Rate  16 /min  

 

 Body Temperature  98.0 F  

 

 BMI (Body Mass Index)  23.5 kg/m2  









 2016  8:43am  Height  68 inches  5'8"









 Weight  160.00 lb  

 

 Heart Rate  80 /min  

 

 BP Systolic Recheck  138 mmHg  

 

 BP Diastolic Recheck  86 mmHg  

 

 Respiratory Rate  16 /min  

 

 Body Temperature  98.0 F  

 

 BMI (Body Mass Index)  24.3 kg/m2  









 2016  8:51am  Height  68 inches  5'8"









 Weight  130.00 lb  

 

 Heart Rate  76 /min  

 

 BP Systolic Recheck  130 mmHg  

 

 BP Diastolic Recheck  84 mmHg  

 

 Respiratory Rate  16 /min  

 

 Body Temperature  98.0 F  

 

 BMI (Body Mass Index)  19.8 kg/m2  









 2015  8:45am  Height  68 inches  5'8"









 Weight  130.00 lb  

 

 Heart Rate  80 /min  

 

 BP Systolic Recheck  128 mmHg  

 

 BP Diastolic Recheck  84 mmHg  

 

 Respiratory Rate  16 /min  

 

 Body Temperature  97.7 F  

 

 BMI (Body Mass Index)  19.8 kg/m2  









 2015  1:44pm  Height  68 inches  5'8"









 Weight  135.00 lb  

 

 BP Systolic Recheck  130 mmHg  

 

 BP Diastolic Recheck  76 mmHg  

 

 Respiratory Rate  16 /min  

 

 Body Temperature  97.9 F  

 

 BMI (Body Mass Index)  20.5 kg/m2  









 2015  8:45am  Height  68 inches  5'8"









 Weight  140.00 lb  

 

 Heart Rate  80 /min  

 

 BP Systolic Recheck  126 mmHg  

 

 BP Diastolic Recheck  80 mmHg  

 

 Respiratory Rate  16 /min  

 

 Body Temperature  97.3 F  

 

 Pain Level  3  

 

 BMI (Body Mass Index)  21.3 kg/m2  









 2015  9:28am  Height  68 inches  5'8"









 Weight  160.00 lb  

 

 Heart Rate  72 /min  

 

 BP Systolic Recheck  130 mmHg  

 

 BP Diastolic Recheck  84 mmHg  

 

 Respiratory Rate  16 /min  

 

 Body Temperature  160.0 F  

 

 Pain Level  7  

 

 BMI (Body Mass Index)  24.3 kg/m2  









 2015  9:02am  Height  68 inches  5'8"









 Weight  140.06 lb  

 

 Heart Rate  76 /min  

 

 BP Systolic Recheck  126 mmHg  

 

 BP Diastolic Recheck  80 mmHg  

 

 Respiratory Rate  16 /min  

 

 Body Temperature  97.6 F  

 

 Pain Level  5  

 

 BMI (Body Mass Index)  21.3 kg/m2  









 2014  9:30am  Height  68 inches  5'8"









 Weight  130.00 lb  

 

 Heart Rate  80 /min  

 

 BP Systolic Recheck  124 mmHg  

 

 BP Diastolic Recheck  84 mmHg  

 

 Respiratory Rate  16 /min  

 

 Body Temperature  97.7 F  

 

 Pain Level  0  

 

 BMI (Body Mass Index)  19.8 kg/m2  









 2014 10:48am  Height  68 inches  5'8"









 Weight  130.00 lb  

 

 Heart Rate  80 /min  

 

 BP Systolic Recheck  130 mmHg  

 

 BP Diastolic Recheck  86 mmHg  

 

 Respiratory Rate  16 /min  

 

 Body Temperature  98.0 F  

 

 Pain Level  4  

 

 BMI (Body Mass Index)  19.8 kg/m2  







Results







 Test  Date  Facility  Test  Result  H/L  Range  Note

 

 CBC Auto Diff  03/15/2019  [facility]  White Blood  20.1 10^3/uL  
High  3.5-10.8  



     [address] [place] [address] [phone]          









 Red Blood Count  4.71 10^6/uL  N  4.18-5.48  

 

 Hemoglobin  14.8 g/dL  N  14.0-18.0  

 

 Hematocrit  44 %  N  36-46  

 

 Mean Corpuscular Volume  94 fL  N  80-94  

 

 Mean Corpuscular Hemoglobin  31 pg  N  27-31  

 

 Mean Corpuscular HGB Conc  33 g/dL  N  31-36  

 

 Red Cell Distribution Width  13 %  N  10.5-15  

 

 Platelet Count  368 10^3/uL  N  150-450  

 

 Mean Platelet Volume  8.3 fL  N  7.4-10.4  

 

 Abs Neutrophils  17.1 10^3/uL  High  1.5-7.7  

 

 Abs Lymphocytes  1.9 10^3/uL  N  1.0-4.8  

 

 Abs Monocytes  1.1 10^3/uL  High  0-0.8  

 

 Abs Eosinophils  0 10^3/uL  N  0-0.6  

 

 Abs Basophils  0 10^3/uL  N  0-0.2  

 

 Abs Nucleated RBC  0 10^3/uL      

 

 Granulocyte %  85.0 %      

 

 Lymphocyte %  9.7 %      

 

 Monocyte %  5.3 %      

 

 Eosinophil %  0 %      

 

 Basophil %  0 %      

 

 Nucleated Red Blood Cells %  0      









 Urinalysis Profile  03/15/2019  [facility]  Urine Color  Yellow    
  



     [address]          



     [place], [place] [address] [phone]          









 Urine Appearance  Cloudy      

 

 Urine Specific Gravity  1.027  N  1.010-1.030  

 

 Urine pH  5.0  N  5-9  

 

 Urine Urobilinogen  Negative    Negative  

 

 Urine Ketones  Trace  Abnormal  Negative  

 

 Urine Protein  Negative    Negative  

 

 Urine Leukocytes  1+  Abnormal  Negative  

 

 Urine Blood  Negative    Negative  

 

 *  *  Abnormal  Negative  1

 

 Urine Nitrite  Negative    Negative  

 

 Urine Bilirubin  Negative    Negative  

 

 Urine Glucose  Negative    Negative  

 

 Urine White Blood Cell  3+(>20/hpf)  Abnormal  Absent  

 

 Urine Red Blood Cell  2+(6-10/hpf)  Abnormal  Absent  

 

 Urine Bacteria  1+  Abnormal  Absent  

 

 Urine Squamous Epithelial Cell  Present  Abnormal  Absent  









 Comp Metabolic Panel  03/15/2019  [facility]  Sodium  139 mmol/L  N
  135-145  



      [place] [address] [phone]          









 Potassium  4.2 mmol/L  N  3.5-5.0  

 

 Chloride  104 mmol/L  N  101-111  

 

 Co2 Carbon Dioxide  24 mmol/L  N  22-32  

 

 Anion Gap  11 mmol/L  N  2-11  

 

 Glucose  105 mg/dL  High    

 

 Blood Urea Nitrogen  21 mg/dL  N  6-24  

 

 Creatinine  1.00 mg/dL  N  0.67-1.17  

 

 BUN/Creatinine Ratio  21.0  High  8-20  

 

 Calcium  9.7 mg/dL  N  8.6-10.3  

 

 Total Protein  7.6 g/dL  N  6.4-8.9  

 

 Albumin  4.7 g/dL  N  3.2-5.2  

 

 Globulin  2.9 g/dL  N  2-4  

 

 Albumin/Globulin Ratio  1.6  N  1-3  

 

 Total Bilirubin  0.60 mg/dL  N  0.2-1.0  

 

 Alkaline Phosphatase  140 U/L  High    

 

 Alt  10 U/L  N  7-52  

 

 Ast  18 U/L  N  13-39  

 

 Egfr Non-  72.3    >60  

 

 Egfr   87.4    >60  2









 Inr/Protime  03/15/2019  [facility]  Inr  0.90  N  0.77-1.02  



     [address]  [place] [address] [phone]          

 

 Laboratory test  03/15/2019  [facility]  Partial  29.1 seconds  N  
26.0-36.3  



 finding    [address]  [address]        



     [place] [address]  PTT        



     [phone]          

 

 Type & Screen  03/15/2019  [facility]  Patient  O Positive      



     [address]  Blood Type        



     [place] [address]          



     [phone]          









 Antibody Screen  NEGATIVE      









 Urine Culture And  03/15/2019  [facility]  Urine Culture  SEE 
RESULT      3



 Sensitivities    [address]    BELOW      



     [place] [address] [phone]          









 1  *Ascorbic acid is present which may interfere with detection



   of blood.

 

 2  *******Because ethnic data is not always readily available,



   this report includes an eGFR for both -Americans and



   non- Americans.****



   The National Kidney Disease Education Program (NKDEP) does



   not endorse the use of the MDRD equation for patients that



   are not between the ages of 18 and 70, are pregnant, have



   extremes of body size, muscle mass, or nutritional status,



   or are non- or non-.



   According to the National Kidney Foundation, irrespective of



   diagnosis, the stage of the disease is based on the level of



   kidney function:



   Stage Description                      GFR(mL/min/1.73 m(2))



   1     Kidney damage with normal or decreased GFR       90



   2     Kidney damage with mild decrease in GFR          60-89



   3     Moderate decrease in GFR                         30-59



   4     Severe decrease in GFR                           15-29



   5     Kidney failure                       <15 (or dialysis)

 

 3  SEE RESULT BELOW



   -----------------------------------------------------------------------------
---------------



   Name:  SAVAGE GARVIN                : 1940    Attend Dr: Guerrero Nunez MD



   Acct:  [id number]  Unit: [id number]  AGE: 78            Location:  [facility]



   Re/15/19                        SEX: M             Status:    REG REF



   -----------------------------------------------------------------------------
---------------



   



   SPEC: [id number]         ERNA:   03/15/    [facility] DR: Guerrero Nunez MD



   REQ:  [id number]              RECD:   03/15/



   STATUS: COMP



   _



   SOURCE: URINE          SPDESC:



   ORDERED:  Urine Culture



   QUERIES:  Urine Source: Random



   



   -----------------------------------------------------------------------------
---------------



   Procedure                         Result                         Reported   
        Site



   -----------------------------------------------------------------------------
---------------



   Urine Culture  Final                                             19-
0812      ML



   



   Organism 1                     ENTEROCOCCUS FAECALIS



   [place] Count                50-75,000 (Many) CFU/ML



   



   1. ENTEROCOCCUS FAECALIS



   M.I.C.      RX



   --------- ------



   Ampicillin                     <=2         S



   Penicillin                     4           S



   Ciprofloxacin                  <=0.5       S



   Gentamicin High Level                      S



   Levofloxacin                   1           S



   Nitrofurantoin                 <=16        S



   * Quinupristin/Dalfopristin      4           R



   * Streptomycin High Level                    S



   Tetracycline                   <=1         S



   Doxycycline - Deduced                      S



   * Minocycline - Deduced                      S



   Tigecycline                    <=0.12      S



   Vancomycin                     1           S



   Imipenem-Deduced                           S



   * Ampicillin/Sulbactam-Deduced               S



   



   * These antibiotics are not available in the [facility] Formulary



   



   Contact the Microbiology Department for any additional



   antibiotic reporting.



   



   -----------------------------------------------------------------------------
---------------



   * [facility] - Main Lab



   .



   



   ** END OF REPORT **



   



   DEPARTMENT OF PATHOLOGY,  [address] [place] [address]



   Phone # [phone]      Fax #[phone]



   Tyree Elam M.D. Director     [facility] # [id number]







Procedures







 Date  Code  Description  Status

 

 201910  Inject/Drain Joint/Bursa Major W/O US  Completed

 

 201910  Inject/Drain Joint/Bursa Major W/O US  Completed

 

 10/23/2018  94077  Inject/Drain Joint/Bursa Major W/O US  Completed

 

 201810  Inject/Drain Joint/Bursa Major W/O US  Completed

 

 2018  62952  Inject/Drain Joint/Bursa Major W/O US  Completed

 

 201810  Inject/Drain Joint/Bursa Major W/O US  Completed

 

 2018  06520  Inject/Drain Joint/Bursa Major W/O US  Completed

 

 2018  17055  Inject/Drain Joint/Bursa Major W/O US  Completed

 

 2018  37604  Inject/Drain Joint/Bursa Major W/O US  Completed

 

 10/17/2017  32382  Inject/Drain Joint/Bursa Major W/O US  Completed

 

 2017  88891  Inject/Drain Joint/Bursa Major W/O US  Completed

 

 06/15/2017  99243  Inject/Drain Joint/Bursa Major W/O US  Completed

 

 06/15/2017  30140  Inject/Drain Joint/Bursa Major W/O US  Completed

 

 201710  Inject/Drain Joint/Bursa Major W/O US  Completed

 

 2017  88377  Inject/Drain Joint/Bursa Major W/O US  Completed

 

 201610  Inject/Drain Joint/Bursa Major W/O US  Completed

 

 201610  Inject/Drain Joint/Bursa Major W/O US  Completed

 

 10/04/2016  42899  Inject/Drain Joint/Bursa Major W/O US  Completed

 

 201610  Inject/Drain Joint/Bursa Major W/O US  Completed

 

 2016  45449  Inject Tendon Sheath Or Ligament Aponeurosis Eg Plantar  
Completed



     Fascia  

 

 201610  Inject/Drain Joint/Bursa Major W/O US  Completed

 

 2015  Inject/Drain Joint/Bursa Major W/O US  Completed

 

 2015  Inject/Drain Joint/Bursa Major W/O US  Completed

 

 2015  Inject/Drain Joint/Bursa Major W/O US  Completed

 

 2015  Inject/Drain Joint/Bursa Major W/O US  Completed

 

 2014  Inject/Drain Joint/Bursa Major W/O US  Completed

 

 2014  Inject/Drain Joint/Bursa Major W/O US  Completed







Encounters







 Type  Date  Location  Provider  Dx  Diagnosis

 

 Office Visit  2019  Orthopedic  KRISTYN [email or web address]  Traumatic



   9:30a  [facility] RENATO [email or web address]    arthropathy,



     [place]      right shoulder

 

 Office Visit  10/04/2016  Orthopedic  ZANDRA Suero.121  Complete



   9:00a  [facility] M.[facility]    rotatr-cuff



     [place]      tear/ruptr of r



           shoulder, not



           trauma









 M19.111  Post-traumatic osteoarthritis, right shoulder









 Office Visit  2016  Orthopedic  Guerrero ALCALA.111  Post-traumatic



   10:15a  [facility] M[facility]    osteoarthritis,



     AT [place]      right shoulder

 

 Office Visit  2016  Bo DE PAZ.122  Complete rotatr-cuff



   9:00a  [facility]    tear/ruptr of left



     AT [place]      shoulder, not trauma









 M19.112  Post-traumatic osteoarthritis, left shoulder









 Office Visit  2015  9:00a  Bo DE PAZ.122  Complete



     [facility]    rotatr-cuff



     AT [place]      tear/ruptr of



           left shoulder,



           not trauma









 M19.112  Post-traumatic osteoarthritis, left shoulder









 Office Visit  2015  2:15p  Bo DE PAZ.122  Complete



     [facility] M[facility]    rotatr-cuff



     [place]      tear/ruptr of



           left shoulder,



           not trauma









 M19.112  Post-traumatic osteoarthritis, left shoulder









 Office Visit  2015  9:15a  Orthopedic  Kimberlyn Del Castillo,  719.41  Pain 
Joint



     [facility]    Shoulder Region



     AT [place]      









 715.31  Osteoarthrosis Localzd Not Spec Prime Or 2Ndy Shoulder









 Office Visit  2015  9:00a  Orthopedic  Kimberlyn Del Castillo,  719.41  Pain 
Joint



     [facility]  M.D.    Shoulder Region



     AT [place]      









 719.41  Pain Joint Shoulder Region

 

 715.31  Osteoarthrosis Localzd Not Spec Prime Or 2Ndy Shoulder









 Office Visit  2014  9:30a  Orthopedic  Kimberlyn Del Castillo,  719.41  Pain 
Joint



     [facility]  M.D.    Shoulder Region



     AT [place]      

 

 Office Visit  2014 10:45a  Orthopedic  Kimberlyn Del Castillo,  719.41  Pain 
Joint



     [facility]  M.D.    Shoulder Region



     AT [place]      









 787.20  Dysphagia, Unspecified









 Office Visit  2013  1:00p  Orthopedic  Guerrero Nunez,  840.4  Sprains &



     [facility]  [email or web address].[email or web address]    Strains Rotator



     [place] [place]      Cuff (Capsule)







Plan of Treatment

Future Appointment(s):2019  9:30 am - FERNANDO Burnett at [facility]2019  8:00 am - Guerrero Nunez M.D. at [facility] AT [facility]32/25/2019  9:30 am - Guerrero Nunez M.D. at 
[facility]2019 - Guerrero Nunez M.D.M75.122 Complete 
rotator cuff tear or rupture of left shoulder, notM19.112 Post-traumatic 
osteoarthritis, left shoulderFollow up:4 months

## 2019-03-26 VITALS — DIASTOLIC BLOOD PRESSURE: 63 MMHG | SYSTOLIC BLOOD PRESSURE: 127 MMHG

## 2019-03-26 LAB
ANION GAP SERPL CALC-SCNC: 10 MMOL/L (ref 2–11)
BUN SERPL-MCNC: 12 MG/DL (ref 6–24)
BUN/CREAT SERPL: 13.5 (ref 8–20)
CALCIUM SERPL-MCNC: 8.8 MG/DL (ref 8.6–10.3)
CHLORIDE SERPL-SCNC: 104 MMOL/L (ref 101–111)
GLUCOSE SERPL-MCNC: 121 MG/DL (ref 70–100)
HCO3 SERPL-SCNC: 24 MMOL/L (ref 22–32)
HCT VFR BLD AUTO: 40 % (ref 36–46)
HGB BLD-MCNC: 13.3 G/DL (ref 14–18)
PLATELET # BLD AUTO: 295 10^3/UL (ref 150–450)
POTASSIUM SERPL-SCNC: 4.2 MMOL/L (ref 3.5–5)
SODIUM SERPL-SCNC: 138 MMOL/L (ref 135–145)

## 2019-03-26 RX ADMIN — DOCUSATE SODIUM SCH MG: 100 CAPSULE, LIQUID FILLED ORAL at 07:41

## 2019-03-26 RX ADMIN — Medication SCH: at 07:54

## 2019-03-26 RX ADMIN — TRAMADOL HYDROCHLORIDE SCH MG: 50 TABLET, FILM COATED ORAL at 12:43

## 2019-03-26 RX ADMIN — CLINDAMYCIN IN 5 PERCENT DEXTROSE SCH MLS/HR: 12 INJECTION, SOLUTION INTRAVENOUS at 11:27

## 2019-03-26 RX ADMIN — TRAMADOL HYDROCHLORIDE SCH MG: 50 TABLET, FILM COATED ORAL at 00:12

## 2019-03-26 RX ADMIN — DEXTROSE MONOHYDRATE AND SODIUM CHLORIDE SCH MLS/HR: 5; .45 INJECTION, SOLUTION INTRAVENOUS at 03:24

## 2019-03-26 RX ADMIN — CLINDAMYCIN IN 5 PERCENT DEXTROSE SCH MLS/HR: 12 INJECTION, SOLUTION INTRAVENOUS at 03:24

## 2019-03-26 RX ADMIN — MAGNESIUM HYDROXIDE SCH ML: 400 SUSPENSION ORAL at 07:41

## 2019-03-26 RX ADMIN — ACETAMINOPHEN SCH MG: 325 TABLET ORAL at 05:52

## 2019-03-26 RX ADMIN — TRAMADOL HYDROCHLORIDE SCH MG: 50 TABLET, FILM COATED ORAL at 07:41

## 2019-03-26 NOTE — DS
Amended report to enter cosigning physician.



CC:  Dr. Guerrero Nunez*

 

DISCHARGE SUMMARY:

 

DATE OF ADMISSION:  03/25/19

 

DATE OF DISCHARGE:  03/26/19

 

SURGEON:  Dr. Guerrero Nunez* (dictated by FERNANDO Burnett).

 

ASSISTANT:  FERNANDO Burnett

 

PRE-OP DIAGNOSIS:  Right shoulder traumatic arthropathy.

 

OPERATIVE PROCEDURE:  Right reverse shoulder arthroplasty.

 

HISTORY:  Mr. Garvin is a 78-year-old male who has a long history of troubles 
with both of his shoulders.  Many years ago, he had undergone rotator cuff 
repairs, these eventually did fail and both shoulders have significant rotator 
cuff tears. On plain x-ray, it could be seen how his humeral head sits on the 
underside of the acromion and he is group home out of joint.  He has significant 
troubles with trying to lift the arms beyond 60 degrees with forward elevation 
and cannot even get to 45 degrees of abduction.  He has undergone conservative 
treatment for years, which he has failed and has elected to undergo a right 
reverse shoulder arthroplasty.

 

HOSPITAL COURSE:  The patient was admitted to [facility] on 03/25/
19. He underwent a right reverse total shoulder arthroplasty without 
complication. Postop day 1, he is well appearing, he is in no acute distress.  
Sling is in place, Cryo unit is in place.  Shoulder dressing was changed.  
Incision clean, dry, intact with well approximated wound edges without erythema 
or discharge.  This is redressed with Betadine, Telfa gauze and tape.  Cryo/
Cuff were placed.

 

Vital Signs:  Temperature 97.3, pulse 88, respiratory rate 16, blood pressure 
127/63, pulse ox 98.

 

LABORATORY DATA:  Hemoglobin 13.3, hematocrit 40.  Sodium 138, potassium 4.2.

 

Patient deemed to be medically and orthopedically stable for discharge home.

 

DISCHARGE MEDICATIONS:  Include:

1.  Omeprazole 20 mg p.o. q.a.m.

2.  Aspirin 81 mg daily.

3.  Acetaminophen 975 p.o. q.8 hours p.r.n., max daily dose of acetaminophen is 
4000 mg from all sources.

4.  Ventolin 2 puffs inhaled q.6 hours p.r.n.

5.  Nitroglycerin 0.4 mg sublingual q.5 minutes p.r.n.

6.  PreserVision 1 tab p.o. b.i.d.

7.  Docusate 100 mg p.o. q.a.m.

8.  Spiriva Respimat 2.5 mcg/act, 2.5 one puff inhaled at bedtime.

9.  Docusate 100 mg p.o. b.i.d.

10.  Tramadol 50 mg p.o. q.6 hours p.r.n., max daily dose of 8.

 

DISCHARGE INSTRUCTIONS:  Discharged on 03/26/19 in stable condition.  The 
patient will be nonweightbearing to the right arm.  No active range of motion 
of the shoulder.  Wear sling at all times except for when showering.  Able to 
remove sling for showering postop day 3.  Regular diet.  Visiting home nurse to 
do wound check. Pain control, tramadol 50 mg 1 to 2 tabs every 6 hours as 
needed for pain, max of 8 tabs per day.  Please follow up with Dr. Nunez in 4 
weeks.  Call for an appointment.

 

____________________________________ FERNANDO BENITEZ

 

[id number]/CPS #: [id number]

[email or web address]

## 2019-03-26 NOTE — PN
Progress Note





- Progress Note


Date of Service: 03/26/19


SOAP: 


Subjective:


[]


Patient seen and examined at bedside. He feels well, pain is well controlled. 

He denies CP, SOB, dizziness, nausea. Right shoulder pain is well controlled, 

he has no numbness or tingling into the RUE. Desires DC home   





Objective:


[]General: Well appearing, NAD, family at bedside


RUE: Sling in place. Right shoulder dressing changed, incision CDI with well 

approximated wound edges without erythema or discharge. Redressed with betadine

, telfa, guaZe and tape. Cryo cuff in use and replaced.


Calves supple and nontender without erythema, edema or palpable cords








Assessment:


[]POD 1 sp right total shoulder reverse arthroplasty Dr Nunez








Plan:


[]NWB RUE. No active ROM, okay passive ROM shoulder 90 deg FF, ABD and 25 deg 

ER. Okay for 


PT/OT


DC home today





 Vital Signs











Temp  97.3 F   03/26/19 07:33


 


Pulse  88   03/26/19 08:00


 


Resp  16   03/26/19 07:45


 


BP  127/63   03/26/19 07:33


 


Pulse Ox  98   03/26/19 07:45








 Intake & Output











 03/25/19 03/26/19 03/26/19





 18:59 06:59 18:59


 


Intake Total 2170 705 320


 


Output Total 425 425 


 


Balance 1745 280 320


 


Weight 156 lb  


 


Intake:   


 


  IV Fluids 2050 55 


 


    ABX - CLINDAMYCIN  55 


 


    LR 2000  


 


    NS 50ML, Cefazolin 2G 50  


 


  Oral 120 650 320


 


Output:   


 


  Urine 425 425 


 


Other:   


 


  Estimated Void  Large Medium


 


  # Bowel Movements  0 


 


  # Voids  1 1








 Laboratory Last Values











WBC  12.3 10^3/uL (3.5-10.8)  H  03/25/19  09:18    


 


Hgb  13.3 g/dL (14.0-18.0)  L  03/26/19  05:42    


 


Hct  40 % (36-46)   03/26/19  05:42    


 


Plt Count  295 10^3/uL (150-450)   03/26/19  05:42    


 


MPV  7.7 fL (7.4-10.4)   03/26/19  05:42    


 


Sodium  138 mmol/L (135-145)   03/26/19  05:42    


 


Potassium  4.2 mmol/L (3.5-5.0)   03/26/19  05:42    


 


Chloride  104 mmol/L (101-111)   03/26/19  05:42    


 


Carbon Dioxide  24 mmol/L (22-32)   03/26/19  05:42    


 


Anion Gap  10 mmol/L (2-11)   03/26/19  05:42    


 


BUN  12 mg/dL (6-24)   03/26/19  05:42    


 


Creatinine  0.89 mg/dL (0.67-1.17)   03/26/19  05:42    


 


Est GFR ( Amer)  100.0  (>60)   03/26/19  05:42    


 


Est GFR (Non-Af Amer)  82.7  (>60)   03/26/19  05:42    


 


BUN/Creatinine Ratio  13.5  (8-20)   03/26/19  05:42    


 


Glucose  121 mg/dL ()  H  03/26/19  05:42    


 


Calcium  8.8 mg/dL (8.6-10.3)   03/26/19  05:42